# Patient Record
(demographics unavailable — no encounter records)

---

## 2018-12-24 NOTE — XMS REPORT
Clinical Summary

                             Created on: 2018



Nano Hanson

External Reference #: [id number]

: 1962

Sex: Female



Demographics







                          Address                   [address] [place] [address]  [address]

 

                          Home Phone                [phone]

 

                          Preferred Language        English

 

                          Marital Status            

 

                          Hoahaoism Affiliation     Unknown

 

                          Race                      White

 

                          Ethnic Group              Non-





Author







                          Author                    [facility]

 

                          Organization              [facility]

 

                          Address                   Unknown

 

                          Phone                     Unavailable







Support







                Name            Relationship    Address         Phone

 

                Margie Christopher    [employer]            Unknown         [phone]







Care Team Providers







                    Care Team Member Name    Role                Phone

 

                    Provider, Unknown    PCP                 Unavailable







Allergies







                                        Comments



                 Active Allergy     Reactions       Severity        Noted Date 

 

                                        



MALIGNANT HYPERTHERMIA



                           Anesthetics - Amide Type       2018 

 

                                         



                           Povidone-Iodine           2018 

 

                                        



STERI TAPE



                           Other                     2018 







Medications







                          End Date                  Status



              Medication     Sig          Dispensed     Refills      Start  



                                         Date  

 

                                                    Active



                 pantoprazole (PROTONIX)     TAKE 1 TABLET      0                 



                     40 MG EC tablet     ONCE A DAY          8  



                                         ORALLY     

 

                                                    Active



                     cholecalciferol, vitamin     Take 1,000          0   



                           D3, (VITAMIN D3 ORAL)     Units by     



                                         mouth daily.     

 

                          2019                Active



              desonide (DESOWEN) 0.05 %     Apply        60 g         2              



                     cream               topically 2         8  



                                         (two) times a     



                                         day.     







Active Problems







 



                           Problem                   Noted Date

 

 



                           Family history of breast cancer     2018

 

 



                           Breast mass               2018

 

 



                           Intertriginous candidiasis     2018







Encounters







                          Care Team                 Description



                     Date                Type                Specialty  

 

                                        



Noé Schafer MD                  Well woman exam with routine gynecological exam (Primary 

Dx); 

Breast cancer screening; 

Screening for osteoporosis; 

Breast mass; 

Intertriginous candidiasis; 

Special screening examination for human papillomavirus (HPV)



                     2018          Office Visit        Obstetrics and Gynecology  



after 2017



Family History







   



                 Medical History     Relation        Name            Comments

 

   



                           Malig Hyperthermia        Father  

 

   



                           Kidney cancer             Maternal  



                                         Grandfather  

 

   



                           Breast cancer             Sister  









   



                 Relation        Name            Status          Comments

 

   



                     Father                          MALIG HYPERTHERMIA



                                         (Age 38) 

 

   



                           Maternal Grandfather       



                                         (Age 85) 

 

   



                     Sister              Alive               ONSET AGE 40







Social History







                                        Date



                 Tobacco Use     Types           Packs/Day       Years Used 

 

                                         



                                         Never Smoker    

 

    



                                         Smokeless Tobacco: Never   



                                         Used   









   



                 Alcohol Use     Drinks/Week     oz/Week         Comments

 

   



                     Yes                 1 Glasses of        0.6 



                                         wine  









 



                           Sex Assigned at Birth     Date Recorded

 

 



                                         Not on file 









                                        Industry



                           Job Start Date            Occupation 

 

                                        Not on file



                           Not on file               Not on file 









                                        Travel End



                           Travel History            Travel Start 

 





                                         No recent travel history available.







Last Filed Vital Signs







                                        Time Taken



                           Vital Sign                Reading 

 

                                        2018 10:24 AM CDT



                           Blood Pressure            145/95 

 

                                        2018 10:24 AM CDT



                           Pulse                     60 

 

                                        -



                           Temperature               - 

 

                                        -



                           Respiratory Rate          - 

 

                                        -



                           Oxygen Saturation         - 

 

                                        -



                           Inhaled Oxygen            - 



                                         Concentration  

 

                                        2018 10:24 AM CDT



                           Weight                    101 kg (223 lb 9.6 oz) 

 

                                        2018 10:24 AM CDT



                           Height                    160 cm (5' 3") 

 

                                        2018 10:24 AM CDT



                           Body Mass Index           39.61 







Plan of Treatment







   



                 Health Maintenance     Due Date        Last Done       Comments

 

   



                           CERVICAL CANCER SCREENING     1983  

 

   



                           COLON CANCER SCREENING     2012  

 

   



                           SHINGLES VACCINES ( of     2012  



                                         2)   

 

   



                           INFLUENZA VACCINE         2018  

 

   



                     BREAST CANCER SCREENING     2019 







Procedures







                                        Comments



                 Procedure Name     Priority        Date/Time       Associated Diagnosis 

 

                                        



Results for this procedure are in the results section.



                 THINPREP TIS PAP     Routine         2018      Well woman exam with 



                           10:15 AM CDT              routine gynecological 



                                         exam 



after 2017



Results

* THINPREP TIS PAP (2018 10:15 AM CDT)









                                         Specimen

 





                                         Swab









 



                           Narrative                 Performed At

 

 



                                         This result has an attachment that is not available. 









   



                 Performing Organization     Address         City/State/Zipcode     Phone Number

 

   



                                         [facility]   



                                         NON-INTERFACED   





after 2017



Insurance







     



            Payer      Benefit     Subscriber ID     Type       Phone      Address



                                         Plan /    



                                         Group    

 

     



                 BCBS            BCBS            xxxxxxxxxxxx     PPO  



                                         CHOICE    



                                         PPO/SHAYLEE LEUNG PPO    









     



            Guarantor Name     Account     Relation to     Date of     Phone      Billing Address



                     Type                Patient             Birth  

 

     



            Nano Hanson     Personal/F     Self       1962     [phone]               ([place])              [place] [address]







Advance Directives





Patient has advance care planning documents on file. For more information, lucinda guillermo contact:



Justin Zhu



[address] [place] [address]

## 2018-12-24 NOTE — DIAGNOSTIC IMAGING REPORT
EXAMINATION:  CXR 2 VIEW - HOPD    



INDICATION:      Cough. Fever  



COMPARISON:  None

     

FINDINGS:

TUBES and LINES:  None.



LUNGS:  Lungs are well inflated.  Perihilar peribronchial hazy opacity could be

due to bronchitis.   There is no evidence of pneumonia or pulmonary edema.



PLEURA:  No pleural effusion or pneumothorax.



HEART AND MEDIASTINUM:  The cardiomediastinal silhouette is unremarkable.    



BONES AND SOFT TISSUES:  No acute osseous lesion.  Soft tissues are

unremarkable.



UPPER ABDOMEN: No free air under the diaphragm.    



IMPRESSION: 

Perihilar peribronchial hazy opacity could be due to bronchitis.   





Signed by: Dr. Wilbert Nicole M.D. on 12/24/2018 9:31 AM